# Patient Record
Sex: MALE | Race: ASIAN | NOT HISPANIC OR LATINO | Employment: FULL TIME | URBAN - METROPOLITAN AREA
[De-identification: names, ages, dates, MRNs, and addresses within clinical notes are randomized per-mention and may not be internally consistent; named-entity substitution may affect disease eponyms.]

---

## 2018-09-21 ENCOUNTER — TRANSCRIBE ORDERS (OUTPATIENT)
Dept: ADMINISTRATIVE | Facility: HOSPITAL | Age: 48
End: 2018-09-21

## 2018-09-21 DIAGNOSIS — G47.30 SLEEP APNEA, UNSPECIFIED TYPE: Primary | ICD-10-CM

## 2018-09-21 DIAGNOSIS — R06.83 SNORING: ICD-10-CM

## 2018-10-24 ENCOUNTER — OFFICE VISIT (OUTPATIENT)
Dept: SLEEP CENTER | Facility: CLINIC | Age: 48
End: 2018-10-24
Payer: COMMERCIAL

## 2018-10-24 VITALS
WEIGHT: 178 LBS | HEIGHT: 67 IN | SYSTOLIC BLOOD PRESSURE: 110 MMHG | BODY MASS INDEX: 27.94 KG/M2 | HEART RATE: 68 BPM | DIASTOLIC BLOOD PRESSURE: 70 MMHG

## 2018-10-24 DIAGNOSIS — G47.10 HYPERSOMNIA: ICD-10-CM

## 2018-10-24 DIAGNOSIS — E66.3 OVERWEIGHT (BMI 25.0-29.9): ICD-10-CM

## 2018-10-24 DIAGNOSIS — R06.83 SNORING: ICD-10-CM

## 2018-10-24 DIAGNOSIS — G47.30 SLEEP APNEA, UNSPECIFIED TYPE: Primary | ICD-10-CM

## 2018-10-24 DIAGNOSIS — F51.12 INSUFFICIENT SLEEP SYNDROME: ICD-10-CM

## 2018-10-24 PROCEDURE — 99244 OFF/OP CNSLTJ NEW/EST MOD 40: CPT | Performed by: INTERNAL MEDICINE

## 2018-10-24 RX ORDER — MELATONIN
1000 DAILY
COMMUNITY
End: 2019-12-13

## 2018-10-24 NOTE — PROGRESS NOTES
Consultation - [address]  57 y o  male  :1970  TINY:[id number]    Physician Requesting Consult: Trent Rubio MD     Reason for Consult : At your kind request I saw this patient for initial sleep evaluation today  The patient is here to evaluate for suspected Obstructive Sleep Apnea  PFSH, Problem List, Medications & Allergies were reviewed in EMR  He  has a past medical history of Sleep apnea  He has a current medication list which includes the following prescription(s): cholecalciferol  HPI:  He reports snoring of at least 10 years duration, aggravated by weight gain and sleeping supine  Snoring disturbs his wife was also witnessed apneas  On occasion he awakens himself with snoring  He is not aware of breathing difficulties during sleep  Restless Leg Syndrome: reports no suggestive symptoms    Parasomnia activity: no features reported   Sleep Routine: Typical Bedtime:  11:30 p m  Gets OOB:  4:10 a m  For prayer and thereafter gets an additional hours sleep Pt [email or web address] [email or web address] hrs  Sleep latency: < 15 minutes Sleep Interruptions: 2 x/night and has no difficulty falling back asleep  Awakens: with the aid of an alarm feeling unrefreshed  He has Excessive Daytime Sleepiness and struggles to stay alert during the day  [place] Sleepiness Scale rated at Total score: 10 /24  Habits: reports that he has never smoked  He has never used smokeless tobacco , has no alcohol history on file  ,  has no drug history on file  ,Caffeine use: moderate , Exercise routine: regular    Family History: Negative for sleep disturbance  ROS: reviewed & as attached  Weight has been stable  A 12 point review of systems was otherwise negative  EXAM:  key  [x] system is Normal  [] see notes  VITALS      /70   Pulse 68   Ht 5' 7" (1 702 m)   Wt 80 7 kg (178 lb)   BMI 27 88 kg/m²     GENERAL[x]  Well groomed male, well appearing, in no apparent distress  PSYCH      Alert and cooperative  Mental state appears normal  Judgement & Insight good   EXTM/SKN[x]      No pedal edema  Color and hydration are good  Skin is warm and dry  HEAD       [x]     Craniofacial anatomy: normal  Sinuses non- tender  TMJ Normal   EYES       [x] EOMI   LYMPH No Cervical, Submandibular Lymhadenopathy    Neck         []  Neck Circumference: 38 cm, appears thick; no abnormal masses or  Thyroid is normal  Trachea is central     Nasal        []  Airway is patent Septum is deviated, Mucous membranes appear normal  Turbinates are normal  There is no rhinorrhea; No PND  Oral          []    Airway       crowded Modified Mallampati class III (soft and hard palate and base of uvula visible)  Palate:  redundant soft palate and Uvular HypertrophyTonsils: no hypertrophy  Teeth normal      CVS         [x]  Heart sounds are regular, No murmurs  RESP       [x]  Effort is normal  Air entry good bilaterally  No wheezes  No rales  ABD         [x]  obese, soft & non-tender  CNS         [x]  WNL Speech is clear & coherant  Grossly intact  Rombergs Negative  MSK         [x]  Muscle bulk, tone and power WNL Gait:normal      IMPRESSION: Primary Sleep/Secondary(to Medical or Psych conditions) & comorbidities   1  Sleep apnea, unspecified type  Ambulatory referral to Sleep Medicine    Diagnostic Sleep Study    CPAP Study   2  Snoring  Ambulatory referral to Sleep Medicine   3  Hypersomnia     4  Insufficient sleep syndrome     5  Overweight (BMI 25 0-29  9)        PLAN:   1  Comprehensive counseling was provided on pathophysiology, diagnostic strategies & treatment options; effects on symptoms and comorbidities; risks of inadequate therapy; costs and insurance aspects  2  I advised weight reduction, avoiding sleeping supine, using alcohol or sedating medications close to bed time and on safe driving practices  3  I also advised allowing sufficient opportunity for sleep    4  Nocturnal polysomnography is indicated and a diagnostic study will be scheduled  5  Patient is willing to try Positive airway pressure therapy and will be scheduled for a titration study if indicated  6  Follow-up will be scheduled after the studies to review results, further details of treatment options and to initiate/adjust therapy  Thank you for allowing me to participate in the care of this patient  I will keep you apprised of developments          Sincerely,     Authenticated electronically by Dena Goldberg MD   on 39/91/60   Board Certified Specialist

## 2018-10-24 NOTE — PATIENT INSTRUCTIONS
What is MEKHI? Obstructive sleep apnea is a common and serious sleep disorder that causes you to stop breathing during sleep  The airway repeatedly becomes blocked, limiting the amount of air that reaches your lungs  When this happens, you may snore loudly or making choking noises as you try to breathe  Your brain and body becomes oxygen deprived and you may wake up  This may happen a few times a night, or in more severe cases, several hundred times a night  Sleep apnea can make you wake up in the morning feeling tired or unrefreshed even though you have had a full night of sleep  During the day, you may feel fatigued, have difficulty concentrating or you may even unintentionally fall asleep  This is because your body is waking up numerous times throughout the night, even though you might not be conscious of each awakening  The lack of oxygen your body receives can have negative long-term consequences for your health  This includes:  High blood pressure  Heart disease  Irregular heart rhythms  Stroke  Pre-diabetes and diabetes  Depression    Testing  An objective evaluation of your sleep may be needed before your board certified sleep physician can make a diagnosis  Options include:   In-lab overnight sleep study  This type of sleep study requires you to stay overnight at a sleep center, in a bed that may resemble a hotel room  You will sleep with sensors hooked up to various parts of your body  These sensors record your brain waves, heartbeat, breathing and movement  An overnight sleep study also provides your doctor with the most complete information about your sleep  Learn more about an overnight sleep study      Home sleep apnea test  Some patients with high risk factors for obstructive sleep apnea and no other medical disorders may be candidates for a home sleep apnea test  The testing equipment differs in that it is less complicated than what is used in an overnight sleep study   As such, does not give all the data an in-lab will and if negative, may not mean you do not have the problem  Treatment for sleep apnea  includes using a continuous positive airway pressure (CPAP) machine to keep your airway open during sleep  A mask is placed over your nose and mouth, or just your nose  The mask is hooked to the CPAP machine that blows a gentle stream of air into the mask when you breathe  This helps keep your airway open so you can breathe more regularly  Extra oxygen may be given to you through the machine  You may be given a mouth device  It looks like a mouth guard or dental retainer and stops your tongue and mouth tissues from blocking your throat while you sleep  Surgery may be needed to remove extra tissues that block your mouth, throat, or nose  Manage sleep apnea:   Do not smoke  Nicotine and other chemicals in cigarettes and cigars can cause lung damage  Ask your healthcare provider for information if you currently smoke and need help to quit  E-cigarettes or smokeless tobacco still contain nicotine  Talk to your healthcare provider before you use these products  Do not drink alcohol or take sedative medicine before you go to sleep  Alcohol and sedatives can relax the muscles and tissues around your throat  This can block the airflow to your lungs  Maintain a healthy weight  Excess tissue around your throat may restrict your breathing  Ask your healthcare provider for information if you need to lose weight  Sleep on your side or use pillows designed to prevent sleep apnea  This prevents your tongue or other tissues from blocking your throat  You can also raise the head of your bed  Driving Safety  Refrain from driving when drowsy  Follow up with your healthcare provider as directed:  Write down your questions so you remember to ask them during your visits  Go to [facility] website for more information: Sleepeducation  org     What is MEKHI?    Obstructive sleep apnea is a common and serious sleep disorder that causes you to stop breathing during sleep  The airway repeatedly becomes blocked, limiting the amount of air that reaches your lungs  When this happens, you may snore loudly or making choking noises as you try to breathe  Your brain and body becomes oxygen deprived and you may wake up  This may happen a few times a night, or in more severe cases, several hundred times a night  Sleep apnea can make you wake up in the morning feeling tired or unrefreshed even though you have had a full night of sleep  During the day, you may feel fatigued, have difficulty concentrating or you may even unintentionally fall asleep  This is because your body is waking up numerous times throughout the night, even though you might not be conscious of each awakening  The lack of oxygen your body receives can have negative long-term consequences for your health  This includes:  High blood pressure  Heart disease  Irregular heart rhythms  Stroke  Pre-diabetes and diabetes  Depression    Testing  An objective evaluation of your sleep may be needed before your board certified sleep physician can make a diagnosis  Options include:   In-lab overnight sleep study  This type of sleep study requires you to stay overnight at a sleep center, in a bed that may resemble a hotel room  You will sleep with sensors hooked up to various parts of your body  These sensors record your brain waves, heartbeat, breathing and movement  An overnight sleep study also provides your doctor with the most complete information about your sleep  Learn more about an overnight sleep study      Home sleep apnea test  Some patients with high risk factors for obstructive sleep apnea and no other medical disorders may be candidates for a home sleep apnea test  The testing equipment differs in that it is less complicated than what is used in an overnight sleep study   As such, does not give all the data an in-lab will and if negative, may not mean you do not have the problem  Treatment for sleep apnea  includes using a continuous positive airway pressure (CPAP) machine to keep your airway open during sleep  A mask is placed over your nose and mouth, or just your nose  The mask is hooked to the CPAP machine that blows a gentle stream of air into the mask when you breathe  This helps keep your airway open so you can breathe more regularly  Extra oxygen may be given to you through the machine  You may be given a mouth device  It looks like a mouth guard or dental retainer and stops your tongue and mouth tissues from blocking your throat while you sleep  Surgery may be needed to remove extra tissues that block your mouth, throat, or nose  Manage sleep apnea:   Do not smoke  Nicotine and other chemicals in cigarettes and cigars can cause lung damage  Ask your healthcare provider for information if you currently smoke and need help to quit  E-cigarettes or smokeless tobacco still contain nicotine  Talk to your healthcare provider before you use these products  Do not drink alcohol or take sedative medicine before you go to sleep  Alcohol and sedatives can relax the muscles and tissues around your throat  This can block the airflow to your lungs  Maintain a healthy weight  Excess tissue around your throat may restrict your breathing  Ask your healthcare provider for information if you need to lose weight  Sleep on your side or use pillows designed to prevent sleep apnea  This prevents your tongue or other tissues from blocking your throat  You can also raise the head of your bed  Driving Safety  Refrain from driving when drowsy  Follow up with your healthcare provider as directed:  Write down your questions so you remember to ask them during your visits  Go to AAS website for more information: Sleepeducation  org

## 2019-11-08 ENCOUNTER — TRANSCRIBE ORDERS (OUTPATIENT)
Dept: ADMINISTRATIVE | Facility: HOSPITAL | Age: 49
End: 2019-11-08

## 2019-11-08 DIAGNOSIS — G47.30 SLEEP APNEA, UNSPECIFIED TYPE: Primary | ICD-10-CM

## 2019-12-13 ENCOUNTER — TELEPHONE (OUTPATIENT)
Dept: FAMILY MEDICINE CLINIC | Facility: MEDICAL CENTER | Age: 49
End: 2019-12-13

## 2019-12-13 ENCOUNTER — OFFICE VISIT (OUTPATIENT)
Dept: FAMILY MEDICINE CLINIC | Facility: MEDICAL CENTER | Age: 49
End: 2019-12-13
Payer: COMMERCIAL

## 2019-12-13 ENCOUNTER — APPOINTMENT (OUTPATIENT)
Dept: LAB | Facility: MEDICAL CENTER | Age: 49
End: 2019-12-13
Payer: COMMERCIAL

## 2019-12-13 ENCOUNTER — APPOINTMENT (OUTPATIENT)
Dept: RADIOLOGY | Facility: MEDICAL CENTER | Age: 49
End: 2019-12-13
Payer: COMMERCIAL

## 2019-12-13 VITALS
SYSTOLIC BLOOD PRESSURE: 111 MMHG | RESPIRATION RATE: 16 BRPM | BODY MASS INDEX: 26.67 KG/M2 | HEART RATE: 60 BPM | WEIGHT: 176 LBS | DIASTOLIC BLOOD PRESSURE: 70 MMHG | HEIGHT: 68 IN

## 2019-12-13 DIAGNOSIS — Z11.4 SCREENING FOR HIV (HUMAN IMMUNODEFICIENCY VIRUS): ICD-10-CM

## 2019-12-13 DIAGNOSIS — G47.30 SLEEP APNEA, UNSPECIFIED TYPE: ICD-10-CM

## 2019-12-13 DIAGNOSIS — Z11.59 NEED FOR HEPATITIS C SCREENING TEST: ICD-10-CM

## 2019-12-13 DIAGNOSIS — M25.561 CHRONIC PAIN OF BOTH KNEES: ICD-10-CM

## 2019-12-13 DIAGNOSIS — Z11.1 SCREENING FOR TUBERCULOSIS: ICD-10-CM

## 2019-12-13 DIAGNOSIS — Z13.1 SCREENING FOR DIABETES MELLITUS: ICD-10-CM

## 2019-12-13 DIAGNOSIS — G89.29 CHRONIC PAIN OF BOTH KNEES: ICD-10-CM

## 2019-12-13 DIAGNOSIS — Z13.220 SCREENING FOR LIPID DISORDERS: ICD-10-CM

## 2019-12-13 DIAGNOSIS — M25.562 CHRONIC PAIN OF BOTH KNEES: ICD-10-CM

## 2019-12-13 DIAGNOSIS — Z00.00 PHYSICAL EXAM, ANNUAL: Primary | ICD-10-CM

## 2019-12-13 DIAGNOSIS — Z23 ENCOUNTER FOR IMMUNIZATION: ICD-10-CM

## 2019-12-13 PROBLEM — R79.89 LOW SERUM TESTOSTERONE LEVEL: Status: ACTIVE | Noted: 2018-08-29

## 2019-12-13 LAB
ALBUMIN SERPL BCP-MCNC: 4.9 G/DL (ref 3.5–5)
ALP SERPL-CCNC: 40 U/L (ref 46–116)
ALT SERPL W P-5'-P-CCNC: 46 U/L (ref 12–78)
ANION GAP SERPL CALCULATED.3IONS-SCNC: 5 MMOL/L (ref 4–13)
AST SERPL W P-5'-P-CCNC: 26 U/L (ref 5–45)
BILIRUB SERPL-MCNC: 0.41 MG/DL (ref 0.2–1)
BUN SERPL-MCNC: 12 MG/DL (ref 5–25)
CALCIUM SERPL-MCNC: 9.3 MG/DL (ref 8.3–10.1)
CHLORIDE SERPL-SCNC: 109 MMOL/L (ref 100–108)
CHOLEST SERPL-MCNC: 171 MG/DL (ref 50–200)
CO2 SERPL-SCNC: 28 MMOL/L (ref 21–32)
CREAT SERPL-MCNC: 0.82 MG/DL (ref 0.6–1.3)
EST. AVERAGE GLUCOSE BLD GHB EST-MCNC: 103 MG/DL
GFR SERPL CREATININE-BSD FRML MDRD: 105 ML/MIN/1.73SQ M
GLUCOSE P FAST SERPL-MCNC: 94 MG/DL (ref 65–99)
HBA1C MFR BLD: 5.2 % (ref 4.2–6.3)
HCV AB SER QL: NORMAL
HDLC SERPL-MCNC: 60 MG/DL
LDLC SERPL CALC-MCNC: 87 MG/DL (ref 0–100)
POTASSIUM SERPL-SCNC: 3.9 MMOL/L (ref 3.5–5.3)
PROT SERPL-MCNC: 8.1 G/DL (ref 6.4–8.2)
SODIUM SERPL-SCNC: 142 MMOL/L (ref 136–145)
TRIGL SERPL-MCNC: 119 MG/DL

## 2019-12-13 PROCEDURE — 90471 IMMUNIZATION ADMIN: CPT

## 2019-12-13 PROCEDURE — 80053 COMPREHEN METABOLIC PANEL: CPT

## 2019-12-13 PROCEDURE — 80061 LIPID PANEL: CPT

## 2019-12-13 PROCEDURE — 86803 HEPATITIS C AB TEST: CPT

## 2019-12-13 PROCEDURE — 90682 RIV4 VACC RECOMBINANT DNA IM: CPT

## 2019-12-13 PROCEDURE — 86580 TB INTRADERMAL TEST: CPT

## 2019-12-13 PROCEDURE — 99386 PREV VISIT NEW AGE 40-64: CPT

## 2019-12-13 PROCEDURE — 83036 HEMOGLOBIN GLYCOSYLATED A1C: CPT

## 2019-12-13 PROCEDURE — 87389 HIV-1 AG W/HIV-1&-2 AB AG IA: CPT

## 2019-12-13 PROCEDURE — 36415 COLL VENOUS BLD VENIPUNCTURE: CPT

## 2019-12-13 PROCEDURE — 73562 X-RAY EXAM OF KNEE 3: CPT

## 2019-12-13 NOTE — PROGRESS NOTES
Assessment/Plan:    No problem-specific Assessment & Plan notes found for this encounter  Diagnoses and all orders for this visit:    Physical exam, annual  81-year-old male presenting for a physical exam   BMI Counseling: Body mass index is 26 76 kg/m²  The BMI is above normal  Nutrition recommendations include decreasing overall calorie intake  Exercise recommendations include moderate aerobic physical activity for 150 minutes/week  Sleep apnea, unspecified type  -     Ambulatory referral to Sleep Medicine; Future  Referral for Sleep Medicine  Chronic pain of both knees  -     XR knee 3 vw right non injury; Future  -     XR knee 3 vw left non injury; Future  Suspect osteoarthritis  Check knee x-ray  Screening for lipid disorders  -     Lipid Panel with Direct LDL reflex; Future  Screening for diabetes mellitus  -     Comprehensive metabolic panel; Future  -     Hemoglobin A1C; Future  Check labs to screen for lipid disorders and diabetes  Need for hepatitis C screening test  -     Hepatitis C antibody; Future  Screening for HIV (human immunodeficiency virus)  -     HIV 1/2 AG-AB combo; Future  Patient did give verbal consent for hep seen HIV screening  Screening for tuberculosis  -     TB Skin Test  PPD placed today  Patient will come back Monday morning for a PPD read  Encounter for immunization  -     FLUBLOK: influenza vaccine, quadrivalent, recombinant, PF, 0 5 mL  Flu vaccine given and tolerated well  Follow-up in one year sooner if needed  Subjective:      Patient ID: Omar Flaherty is a 54 y o  male  Patient presents to establish care  He is here today for a physical exam   He does need a PPD placement as 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  He states he eats a healthy diet  He states he exercises daily  He does not smoke  No alcohol use  No drug use  He is agreeable to the flu vaccine  He is agreeable to blood work    He does complain of bilateral knee pain the right being worse than the left for the past 10 years  No known injury  He does need a referral for the sleep specialist as he is supposed to be getting a sleep study  The following portions of the patient's history were reviewed and updated as appropriate:   He  has a past medical history of Sleep apnea  He   Patient Active Problem List    Diagnosis Date Noted    Sleep apnea 10/24/2018    Snoring 10/24/2018    Hypersomnia 10/24/2018    Insufficient sleep syndrome 10/24/2018    Overweight (BMI 25 0-29 9) 10/24/2018    Low serum testosterone level 08/29/2018     He  has a past surgical history that includes Gallbladder surgery  His family history includes Diabetes in his father; Hypertension in his father; No Known Problems in his mother  He  reports that he has never smoked  He has never used smokeless tobacco  He reports that he does not drink alcohol or use drugs  No current outpatient medications on file  No current facility-administered medications for this visit  Current Outpatient Medications on File Prior to Visit   Medication Sig    [DISCONTINUED] cholecalciferol (VITAMIN D3) 1,000 units tablet Take 1,000 Units by mouth daily     No current facility-administered medications on file prior to visit  He is allergic to pollen extract       Review of Systems   Constitutional: Negative for fever  Respiratory: Negative for shortness of breath  Cardiovascular: Negative for chest pain  Gastrointestinal: Negative for abdominal pain and blood in stool  Genitourinary: Negative for dysuria and hematuria  Musculoskeletal: Positive for arthralgias  Negative for myalgias  Skin: Negative for rash  Neurological: Negative for headaches           Objective:      /70 (BP Location: Left arm, Patient Position: Sitting, Cuff Size: Adult)   Pulse 60   Resp 16   Ht 5' 8" (1 727 m)   Wt 79 8 kg (176 lb)   BMI 26 76 kg/m²          Physical Exam   Constitutional: He is oriented to person, place, and time  Vital signs are normal  He appears well-developed and well-nourished  HENT:   Head: Normocephalic and atraumatic  Right Ear: Tympanic membrane, external ear and ear canal normal    Left Ear: Tympanic membrane, external ear and ear canal normal    Nose: Nose normal    Mouth/Throat: Uvula is midline, oropharynx is clear and moist and mucous membranes are normal    Eyes: Pupils are equal, round, and reactive to light  Conjunctivae, EOM and lids are normal    Neck: Trachea normal  Neck supple  No thyromegaly present  Cardiovascular: Normal rate, regular rhythm, S1 normal and S2 normal    No murmur heard  Pulmonary/Chest: Effort normal and breath sounds normal    Abdominal: Soft  Bowel sounds are normal  There is no tenderness  Musculoskeletal:        Right knee: Normal         Left knee: Normal    Lymphadenopathy:     He has no cervical adenopathy  Neurological: He is alert and oriented to person, place, and time  No cranial nerve deficit  Skin: Skin is warm, dry and intact  Psychiatric: He has a normal mood and affect   His speech is normal and behavior is normal  Thought content normal

## 2019-12-13 NOTE — TELEPHONE ENCOUNTER
LM on the nurses line at Dr Jaren Galvan office  Need to know what they need for the patient  Patient said he needed a referral for a sleep study but he has already seen Dr Andrew Saravia  What is the next step?

## 2019-12-15 LAB — HIV 1+2 AB+HIV1 P24 AG SERPL QL IA: NORMAL

## 2019-12-16 ENCOUNTER — CLINICAL SUPPORT (OUTPATIENT)
Dept: FAMILY MEDICINE CLINIC | Facility: MEDICAL CENTER | Age: 49
End: 2019-12-16

## 2019-12-16 ENCOUNTER — APPOINTMENT (OUTPATIENT)
Dept: LAB | Facility: MEDICAL CENTER | Age: 49
End: 2019-12-16
Payer: COMMERCIAL

## 2019-12-16 ENCOUNTER — TELEPHONE (OUTPATIENT)
Dept: FAMILY MEDICINE CLINIC | Facility: MEDICAL CENTER | Age: 49
End: 2019-12-16

## 2019-12-16 DIAGNOSIS — R76.11 POSITIVE PPD: ICD-10-CM

## 2019-12-16 DIAGNOSIS — R76.11 POSITIVE PPD: Primary | ICD-10-CM

## 2019-12-16 DIAGNOSIS — Z11.1 ENCOUNTER FOR PPD SKIN TEST READING: ICD-10-CM

## 2019-12-16 LAB
INDURATION: ABNORMAL MM
TB SKIN TEST: POSITIVE

## 2019-12-16 PROCEDURE — 86480 TB TEST CELL IMMUN MEASURE: CPT

## 2019-12-16 PROCEDURE — 36415 COLL VENOUS BLD VENIPUNCTURE: CPT

## 2019-12-16 NOTE — TELEPHONE ENCOUNTER
Patient had a positive PPD read  Dr Barbosa Adjutant ordered Quant  Gold, patient went directly over to the lab  Need to watch for result and notify patient

## 2019-12-16 NOTE — TELEPHONE ENCOUNTER
I spoke with patient while he was here  He had an 18 mm induration  QuantiFERON gold was ordered  Will follow up results

## 2019-12-17 NOTE — TELEPHONE ENCOUNTER
Sleep center called back- states the patient didn't show for his sleep study appts this years so he needs to see Dr Janis Marie again for evaluation before they can do a sleep study  There is a referral order in the chart for sleep medicine   Will call them back to inform them   Ext [phone]

## 2019-12-17 NOTE — TELEPHONE ENCOUNTER
Still need to clarify if they need something from us or not   There is an existing referral order just done by Dr Jerene Meigs Dec 13th at his visit for a sleep eval  I left a message asking if Evorn Messing could call me back

## 2019-12-18 ENCOUNTER — TELEPHONE (OUTPATIENT)
Dept: FAMILY MEDICINE CLINIC | Facility: MEDICAL CENTER | Age: 49
End: 2019-12-18

## 2019-12-18 LAB
GAMMA INTERFERON BACKGROUND BLD IA-ACNC: 0.04 IU/ML
M TB IFN-G BLD-IMP: NEGATIVE
M TB IFN-G CD4+ BCKGRND COR BLD-ACNC: 0 IU/ML
M TB IFN-G CD4+ BCKGRND COR BLD-ACNC: 0.02 IU/ML
MITOGEN IGNF BCKGRD COR BLD-ACNC: >10 IU/ML

## 2019-12-18 NOTE — TELEPHONE ENCOUNTER
----- Message from Brenda Urban DO sent at 12/18/2019  3:33 PM EST -----  QuantiFERON negative  Patient does not have tuberculosis  He does have forms here that need completion

## 2019-12-18 NOTE — TELEPHONE ENCOUNTER
Patient called back and made aware of results  He will  proof of the negative tb testing  I also notified him that Dr Indu Green will not be ordering the sleep study, patient was not happy   I offered to call Dr Germaine Carrion office directly tomorrow and will see if they can order the study as they did in the past

## 2019-12-18 NOTE — TELEPHONE ENCOUNTER
Blood work unremarkable  Hep C and HIV both negative  Also see results below  Another task as well regarding the sleep study  Per Dr Gordon Lopez, we do not order sleep studies so if he still needs an order, he needs to keep his March appt with Dr Cordova Check

## 2019-12-18 NOTE — TELEPHONE ENCOUNTER
Chandrika from Dr Maki Cuff office called back today and said that the patient saw Dr Fallon Whitmore and was set up for a sleep study, he cancelled, rescheduled, and cancelled again  Those sleep study orders have   They said we can either wait for him to see Dr Fallon Whitmore in January and they will order and get him set up again for the sleep study or we can just order it for him

## 2019-12-23 ENCOUNTER — TELEPHONE (OUTPATIENT)
Dept: FAMILY MEDICINE CLINIC | Facility: MEDICAL CENTER | Age: 49
End: 2019-12-23

## 2019-12-23 DIAGNOSIS — M25.561 CHRONIC PAIN OF BOTH KNEES: Primary | ICD-10-CM

## 2019-12-23 DIAGNOSIS — M25.562 CHRONIC PAIN OF BOTH KNEES: Primary | ICD-10-CM

## 2019-12-23 DIAGNOSIS — G89.29 CHRONIC PAIN OF BOTH KNEES: Primary | ICD-10-CM

## 2019-12-23 NOTE — TELEPHONE ENCOUNTER
Bilateral knee x-ray reviewed  Both knees with mild swelling  I would like patient to see Orthopedics for further evaluation

## 2020-10-14 ENCOUNTER — TELEPHONE (OUTPATIENT)
Dept: FAMILY MEDICINE CLINIC | Facility: MEDICAL CENTER | Age: 50
End: 2020-10-14

## 2020-10-14 DIAGNOSIS — Z20.822 ENCOUNTER BY TELEHEALTH FOR SUSPECTED COVID-19: ICD-10-CM

## 2020-10-14 DIAGNOSIS — Z20.822 ENCOUNTER BY TELEHEALTH FOR SUSPECTED COVID-19: Primary | ICD-10-CM

## 2020-10-14 PROCEDURE — U0003 INFECTIOUS AGENT DETECTION BY NUCLEIC ACID (DNA OR RNA); SEVERE ACUTE RESPIRATORY SYNDROME CORONAVIRUS 2 (SARS-COV-2) (CORONAVIRUS DISEASE [COVID-19]), AMPLIFIED PROBE TECHNIQUE, MAKING USE OF HIGH THROUGHPUT TECHNOLOGIES AS DESCRIBED BY CMS-2020-01-R: HCPCS | Performed by: FAMILY MEDICINE

## 2020-10-15 ENCOUNTER — TELEPHONE (OUTPATIENT)
Dept: FAMILY MEDICINE CLINIC | Facility: MEDICAL CENTER | Age: 50
End: 2020-10-15

## 2020-10-15 LAB — SARS-COV-2 RNA SPEC QL NAA+PROBE: DETECTED

## 2020-10-26 ENCOUNTER — TELEPHONE (OUTPATIENT)
Dept: FAMILY MEDICINE CLINIC | Facility: MEDICAL CENTER | Age: 50
End: 2020-10-26

## 2020-10-26 DIAGNOSIS — Z20.822 ENCOUNTER FOR SCREENING LABORATORY TESTING FOR COVID-19 VIRUS: Primary | ICD-10-CM

## 2020-10-26 DIAGNOSIS — Z20.822 ENCOUNTER FOR SCREENING LABORATORY TESTING FOR COVID-19 VIRUS: ICD-10-CM

## 2020-10-26 PROCEDURE — U0003 INFECTIOUS AGENT DETECTION BY NUCLEIC ACID (DNA OR RNA); SEVERE ACUTE RESPIRATORY SYNDROME CORONAVIRUS 2 (SARS-COV-2) (CORONAVIRUS DISEASE [COVID-19]), AMPLIFIED PROBE TECHNIQUE, MAKING USE OF HIGH THROUGHPUT TECHNOLOGIES AS DESCRIBED BY CMS-2020-01-R: HCPCS | Performed by: FAMILY MEDICINE

## 2020-10-27 ENCOUNTER — TELEPHONE (OUTPATIENT)
Dept: FAMILY MEDICINE CLINIC | Facility: MEDICAL CENTER | Age: 50
End: 2020-10-27

## 2020-10-27 LAB — SARS-COV-2 RNA SPEC QL NAA+PROBE: DETECTED

## 2020-12-14 ENCOUNTER — OFFICE VISIT (OUTPATIENT)
Dept: FAMILY MEDICINE CLINIC | Facility: MEDICAL CENTER | Age: 50
End: 2020-12-14
Payer: COMMERCIAL

## 2020-12-14 VITALS
SYSTOLIC BLOOD PRESSURE: 132 MMHG | HEART RATE: 74 BPM | OXYGEN SATURATION: 97 % | DIASTOLIC BLOOD PRESSURE: 84 MMHG | TEMPERATURE: 98 F | BODY MASS INDEX: 33.45 KG/M2 | WEIGHT: 200.8 LBS | HEIGHT: 65 IN

## 2020-12-14 DIAGNOSIS — Z13.220 SCREENING FOR LIPID DISORDERS: ICD-10-CM

## 2020-12-14 DIAGNOSIS — Z13.1 SCREENING FOR DIABETES MELLITUS: ICD-10-CM

## 2020-12-14 DIAGNOSIS — Z13.0 SCREENING, ANEMIA, DEFICIENCY, IRON: ICD-10-CM

## 2020-12-14 DIAGNOSIS — Z13.29 SCREENING FOR THYROID DISORDER: ICD-10-CM

## 2020-12-14 DIAGNOSIS — Z00.00 PHYSICAL EXAM, ANNUAL: Primary | ICD-10-CM

## 2020-12-14 PROBLEM — G47.10 HYPERSOMNIA: Status: RESOLVED | Noted: 2018-10-24 | Resolved: 2020-12-14

## 2020-12-14 PROBLEM — E66.3 OVERWEIGHT (BMI 25.0-29.9): Status: RESOLVED | Noted: 2018-10-24 | Resolved: 2020-12-14

## 2020-12-14 PROBLEM — R06.83 SNORING: Status: RESOLVED | Noted: 2018-10-24 | Resolved: 2020-12-14

## 2020-12-14 PROBLEM — F51.12 INSUFFICIENT SLEEP SYNDROME: Status: RESOLVED | Noted: 2018-10-24 | Resolved: 2020-12-14

## 2020-12-14 PROBLEM — G47.30 SLEEP APNEA: Status: RESOLVED | Noted: 2018-10-24 | Resolved: 2020-12-14

## 2020-12-14 PROBLEM — R79.89 LOW SERUM TESTOSTERONE LEVEL: Status: RESOLVED | Noted: 2018-08-29 | Resolved: 2020-12-14

## 2020-12-14 PROCEDURE — 99396 PREV VISIT EST AGE 40-64: CPT | Performed by: FAMILY MEDICINE

## 2020-12-14 PROCEDURE — 3725F SCREEN DEPRESSION PERFORMED: CPT | Performed by: FAMILY MEDICINE

## 2020-12-14 PROCEDURE — 3008F BODY MASS INDEX DOCD: CPT | Performed by: FAMILY MEDICINE

## 2020-12-14 PROCEDURE — 1036F TOBACCO NON-USER: CPT | Performed by: FAMILY MEDICINE

## 2023-08-22 NOTE — PROGRESS NOTES
Review of Systems      Genitourinary none   Cardiology none   Gastrointestinal none   Neurology forgetfulness and difficulty with memory   Constitutional none   Integumentary none   Psychiatry anxiety and mood change   Musculoskeletal none   Pulmonary snoring and difficulty breathing when lying flat    ENT none   Endocrine none   Hematological none Length To Time In Minutes Device Was In Place: 10